# Patient Record
Sex: MALE | Race: ASIAN | NOT HISPANIC OR LATINO
[De-identification: names, ages, dates, MRNs, and addresses within clinical notes are randomized per-mention and may not be internally consistent; named-entity substitution may affect disease eponyms.]

---

## 2019-04-04 ENCOUNTER — APPOINTMENT (OUTPATIENT)
Dept: UROLOGY | Facility: CLINIC | Age: 79
End: 2019-04-04
Payer: MEDICARE

## 2019-04-04 DIAGNOSIS — Z78.9 OTHER SPECIFIED HEALTH STATUS: ICD-10-CM

## 2019-04-04 LAB
ANION GAP SERPL CALC-SCNC: 14 MMOL/L
APPEARANCE: CLEAR
BACTERIA: NEGATIVE
BILIRUBIN URINE: NEGATIVE
BLOOD URINE: NEGATIVE
BUN SERPL-MCNC: 18 MG/DL
CALCIUM SERPL-MCNC: 10.1 MG/DL
CHLORIDE SERPL-SCNC: 97 MMOL/L
CO2 SERPL-SCNC: 30 MMOL/L
COLOR: NORMAL
CREAT SERPL-MCNC: 0.86 MG/DL
GLUCOSE QUALITATIVE U: NEGATIVE
GLUCOSE SERPL-MCNC: 124 MG/DL
HYALINE CASTS: 0 /LPF
KETONES URINE: NEGATIVE
LEUKOCYTE ESTERASE URINE: NEGATIVE
MICROSCOPIC-UA: NORMAL
NITRITE URINE: NEGATIVE
PH URINE: 7
POTASSIUM SERPL-SCNC: 4.4 MMOL/L
PROTEIN URINE: NEGATIVE
PSA FREE FLD-MCNC: 15 %
PSA FREE SERPL-MCNC: 0.74 NG/ML
PSA SERPL-MCNC: 5.05 NG/ML
RED BLOOD CELLS URINE: 0 /HPF
SODIUM SERPL-SCNC: 141 MMOL/L
SPECIFIC GRAVITY URINE: 1.01
SQUAMOUS EPITHELIAL CELLS: 0 /HPF
UROBILINOGEN URINE: NORMAL
WHITE BLOOD CELLS URINE: 0 /HPF

## 2019-04-04 PROCEDURE — 99204 OFFICE O/P NEW MOD 45 MIN: CPT

## 2019-04-04 RX ORDER — METOPROLOL TARTRATE 75 MG/1
TABLET, FILM COATED ORAL
Refills: 0 | Status: ACTIVE | COMMUNITY

## 2019-04-04 RX ORDER — ENEMA 19; 7 G/133ML; G/133ML
7-19 ENEMA RECTAL
Qty: 2 | Refills: 0 | Status: ACTIVE | COMMUNITY
Start: 2019-04-04 | End: 1900-01-01

## 2019-04-04 RX ORDER — BENAZEPRIL HYDROCHLORIDE 40 MG/1
40 TABLET ORAL
Refills: 0 | Status: ACTIVE | COMMUNITY

## 2019-04-04 RX ORDER — ATORVASTATIN CALCIUM 80 MG/1
TABLET, FILM COATED ORAL
Refills: 0 | Status: ACTIVE | COMMUNITY

## 2019-04-04 RX ORDER — AMLODIPINE BESYLATE 5 MG/1
TABLET ORAL
Refills: 0 | Status: ACTIVE | COMMUNITY

## 2019-04-04 NOTE — LETTER BODY
[FreeTextEntry1] : Ronda Harris M.D.\par [address] [address]\par \par Dear Dr. Harris,\par \par Reason for Visit: Elevated PSA.\par \par This is a 78 -year-old gentleman with elevated PSA. He was referred for evaluation, accompanied by his son. Patient reports that he has not had a previous prostate biopsy. His current PSA is 4.06. Patient shares his PSA was 3.52 last year. He is currently taking Flomax and Proscar without any side-effects or difficulties. He has stable urinary flow. Patient denies any hematuria or lower urinary tract symptoms. He denies any pain.The patient denies any aggravating or relieving factors. The patient denies any interference of function. The patient is entirely asymptomatic. All other review of systems are negative. He has no cancer in his family medical history. He has no previous surgical history. Past medical history, family history and social history were inquired and were non contributory to current condition. The patient does not use tobacco or drink alcohol. Medications and allergies were reviewed.\par \par On examination, the patient is an older -appearing gentleman in no acute distress. He is alert and oriented follows commands. He has normal mood and affect. He is normocephalic. Neck is supple. Oral no thrush. Respirations are unlabored. His abdomen is soft and nontender. Bladder is nonpalpable. No CVA tenderness. Neurologically he is grossly intact. No peripheral edema. Skin without gross abnormality. He has normal male external genitalia. Normal meatus. Bilateral testes are descended intrascrotally and normal to palpation. On rectal examination, there is normal sphincter tone. The prostate is clinically benign without focal induration or nodularity.\par \par His current PSA is 4.06, which is elevated. Previous PSA two weeks ago was 4.28. \par \par Assessment: Elevated PSA.\par \par I counseled the patient. I discussed with him the risk of occult malignancy. He will obtain BMP and urinalysis today. I discussed the various etiologies of PSA elevation, including enlargement of prostate, inflammation or infection, and prostate cancer. Patient would like to confirm the diagnosis with repeating the PSA. I also ordered a prostate MRI to evaluate for abnormality. Risks and alternatives were discussed. I answered the patient questions. The patient will follow-up as directed and will contact me with any questions or concerns.Thank you for the opportunity to participate in the care of this patient. I'll keep you updated on his progress.\par \par Plan: BMP. PSA. Urinalysis. Prostate MRI. Followup in 6 months.

## 2019-04-04 NOTE — ADDENDUM
[FreeTextEntry1] : Entered by Samantha Khan, acting as scribe for Dr. Kaminski. \par \par The documentation recorded by the scribe accurately reflects the service I personally performed and the decisions made by me.\par

## 2019-04-30 ENCOUNTER — APPOINTMENT (OUTPATIENT)
Dept: UROLOGY | Facility: CLINIC | Age: 79
End: 2019-04-30
Payer: MEDICARE

## 2019-04-30 VITALS
SYSTOLIC BLOOD PRESSURE: 155 MMHG | OXYGEN SATURATION: 96 % | DIASTOLIC BLOOD PRESSURE: 92 MMHG | TEMPERATURE: 98 F | WEIGHT: 131 LBS | RESPIRATION RATE: 17 BRPM | HEART RATE: 90 BPM

## 2019-04-30 PROCEDURE — 99213 OFFICE O/P EST LOW 20 MIN: CPT

## 2019-04-30 NOTE — ADDENDUM
[FreeTextEntry1] : Entered by Luzma Urbina, acting as scribe for Dr. Alexis Kaminski.\par \par The documentation recorded by the scribe accurately reflects the service I personally performed and the decisions made by me.

## 2019-04-30 NOTE — LETTER BODY
[FreeTextEntry1] : Ronda Harris M.D.\par [address] [address]\par \par Dear Dr. Harris,\par \par Reason for Visit: Elevated PSA.\par \par This is a 78 -year-old gentleman with elevated PSA. Patient reports that he has not had a previous prostate biopsy. His current PSA is 4.06. Patient shares his PSA was 3.52 last year. He is currently taking Flomax and Proscar without any side-effects or difficulties. He has stable urinary flow. Patient returns today for MRI results. Since his last visit, the patient denies any changes in health. His recent prostate MRI was unremarkable. Patient denies any hematuria or lower urinary tract symptoms. He denies any pain.The patient denies any aggravating or relieving factors. The patient denies any interference of function. The patient is entirely asymptomatic. All other review of systems are negative. He has no cancer in his family medical history. He has no previous surgical history. Past medical history, family history and social history were inquired and were non contributory to current condition. The patient does not use tobacco or drink alcohol. Medications and allergies were reviewed.\par \par On examination, the patient is an older -appearing gentleman in no acute distress. He is alert and oriented follows commands. He has normal mood and affect. He is normocephalic. Neck is supple. Oral no thrush. Respirations are unlabored. His abdomen is soft and nontender. Bladder is nonpalpable. No CVA tenderness. Neurologically he is grossly intact. No peripheral edema. Skin without gross abnormality. He has normal male external genitalia. Normal meatus. Bilateral testes are descended intrascrotally and normal to palpation. On rectal examination, there is normal sphincter tone. The prostate is enlarged, 66 cc.\par \par His recent prostate MRI was unremarkable.\par His BMP demonstrated normal renal functions, creatinine 0.86. His PSA was 5.05, which is elevated. His urinalysis was unremarkable. His urine culture was negative. \par \par Assessment: Elevated PSA.\par \par I counseled the patient. His prostate MRI was unremarkable. I believe his elevated PSA is secondary to his enlarged prostate. Risks and alternatives were discussed. I answered the patient questions. The patient will follow-up as directed and will contact me with any questions or concerns.Thank you for the opportunity to participate in the care of this patient. I'll keep you updated on his progress.\par \par Plan: Followup in 6 months. \par

## 2019-07-09 ENCOUNTER — APPOINTMENT (OUTPATIENT)
Dept: OPHTHALMOLOGY | Facility: CLINIC | Age: 79
End: 2019-07-09

## 2019-11-01 ENCOUNTER — APPOINTMENT (OUTPATIENT)
Dept: UROLOGY | Facility: CLINIC | Age: 79
End: 2019-11-01
Payer: MEDICARE

## 2019-11-01 VITALS
RESPIRATION RATE: 17 BRPM | TEMPERATURE: 97.8 F | OXYGEN SATURATION: 97 % | DIASTOLIC BLOOD PRESSURE: 95 MMHG | SYSTOLIC BLOOD PRESSURE: 143 MMHG | HEART RATE: 96 BPM

## 2019-11-01 PROCEDURE — 99213 OFFICE O/P EST LOW 20 MIN: CPT

## 2019-11-01 NOTE — LETTER BODY
[FreeTextEntry1] : Ronda Harris M.D.\par [address] [address]\par \par Dear Dr. Harris,\par \par Reason for Visit: Elevated PSA.\par \par This is a 78 -year-old gentleman with elevated PSA. Patient reports that he has not had a previous prostate biopsy. His recent MRI was unremarkable. His current PSA is 5.05. Patient returns for follow-up. He is currently taking Flomax and Proscar without any side-effects or difficulties. He has stable urinary flow. Since his last visit, the patient denies any changes in health. Patient denies any hematuria or lower urinary tract symptoms. He denies any pain.The patient denies any aggravating or relieving factors. The patient denies any interference of function. The patient is entirely asymptomatic. All other review of systems are negative. He has no cancer in his family medical history. He has no previous surgical history. Past medical history, family history and social history were unchanged. Medications and allergies were reviewed.\par \par On examination, the patient is an older -appearing gentleman in no acute distress. He is alert and oriented follows commands. He has normal mood and affect. He is normocephalic. Neck is supple. Oral no thrush. Respirations are unlabored. His abdomen is soft and nontender. Bladder is nonpalpable. No CVA tenderness. Neurologically he is grossly intact. No peripheral edema. Skin without gross abnormality. He has normal male external genitalia. Normal meatus. Bilateral testes are descended intrascrotally and normal to palpation. On rectal examination, there is normal sphincter tone. The prostate is enlarged, 66 cc.\par \par His recent prostate MRI was unremarkable.\par  His PSA was 5.05, which is age appropriate.\par \par Assessment: Elevated PSA, stable on Proscar.\par \par I counseled the patient. In terms of his PSA and BPH, I recommend he continue taking Proscar and Flomax as directed. I also recommend he obtain BMP and PSA testing today to ensure stability. Risks and alternatives were discussed. I answered the patient questions. The patient will follow-up as directed and will contact me with any questions or concerns.Thank you for the opportunity to participate in the care of this patient. I'll keep you updated on his progress.\par \par Plan: Cont. Proscar and Flomax. BMP. PSA. Followup in 6 months.

## 2019-11-01 NOTE — ADDENDUM
[FreeTextEntry1] : Entered by Mekhi Park, acting as scribe for Dr. Alexis Kaminski.\par \par The documentation recorded by the scribe accurately reflects the service I personally performed and the decisions made by me.

## 2019-11-04 ENCOUNTER — RECORD ABSTRACTING (OUTPATIENT)
Age: 79
End: 2019-11-04

## 2019-11-04 DIAGNOSIS — R80.9 PROTEINURIA, UNSPECIFIED: ICD-10-CM

## 2019-11-04 DIAGNOSIS — R97.20 ELEVATED PROSTATE, SPECIFIC ANTIGEN [PSA]: ICD-10-CM

## 2019-11-04 DIAGNOSIS — Z00.00 ENCOUNTER FOR GENERAL ADULT MEDICAL EXAMINATION W/OUT ABNORMAL FINDINGS: ICD-10-CM

## 2019-11-04 LAB
ANION GAP SERPL CALC-SCNC: 14 MMOL/L
BUN SERPL-MCNC: 20 MG/DL
CALCIUM SERPL-MCNC: 10.4 MG/DL
CHLORIDE SERPL-SCNC: 99 MMOL/L
CO2 SERPL-SCNC: 29 MMOL/L
CREAT SERPL-MCNC: 0.91 MG/DL
GLUCOSE SERPL-MCNC: 126 MG/DL
POTASSIUM SERPL-SCNC: 4.4 MMOL/L
PSA FREE FLD-MCNC: 28 %
PSA FREE SERPL-MCNC: 2.27 NG/ML
PSA SERPL-MCNC: 8.04 NG/ML
SODIUM SERPL-SCNC: 142 MMOL/L

## 2019-11-04 RX ORDER — ENEMA 19; 7 G/133ML; G/133ML
7-19 ENEMA RECTAL
Qty: 2 | Refills: 0 | Status: ACTIVE | COMMUNITY
Start: 2019-11-04 | End: 1900-01-01

## 2019-11-07 ENCOUNTER — CLINICAL ADVICE (OUTPATIENT)
Age: 79
End: 2019-11-07

## 2019-11-15 ENCOUNTER — RECORD ABSTRACTING (OUTPATIENT)
Age: 79
End: 2019-11-15

## 2020-08-04 ENCOUNTER — APPOINTMENT (OUTPATIENT)
Dept: UROLOGY | Facility: CLINIC | Age: 80
End: 2020-08-04

## 2021-06-25 ENCOUNTER — APPOINTMENT (OUTPATIENT)
Dept: UROLOGY | Facility: CLINIC | Age: 81
End: 2021-06-25
Payer: MEDICARE

## 2021-06-25 VITALS
RESPIRATION RATE: 17 BRPM | HEART RATE: 84 BPM | SYSTOLIC BLOOD PRESSURE: 155 MMHG | TEMPERATURE: 98 F | OXYGEN SATURATION: 95 % | DIASTOLIC BLOOD PRESSURE: 89 MMHG | WEIGHT: 131 LBS

## 2021-06-25 PROCEDURE — 99213 OFFICE O/P EST LOW 20 MIN: CPT

## 2021-06-25 NOTE — ADDENDUM
[FreeTextEntry1] : Entered by Venita Jeong, acting as scribe for Dr. Alexis Kaminski.\par \par The documentation recorded by the scribe accurately reflects the service I personally performed and the decisions made by me.

## 2021-06-25 NOTE — HISTORY OF PRESENT ILLNESS
[FreeTextEntry1] : Please refer to URO Consult note \par \par fu elevated psa \par 5.3 improved \par pr-rad 2 mri 2019  \par repeat psa \par fu in 1 yr \par

## 2021-06-25 NOTE — LETTER BODY
[FreeTextEntry1] : Ronda Harris M.D.\par [address] [address]\par \par Dear Dr. Harris,\par \par Reason for Visit: Elevated PSA.\par \par This is a 80 -year-old gentleman with elevated PSA. His prostate MRI from 2019 demonstrated PI-RADS 2. His current PSA is 5.3. Patient returns for follow-up. He is currently taking Flomax and Proscar without any side-effects or difficulties. He has stable urinary flow. Since his last visit, the patient denies any changes in health. Patient denies any hematuria or lower urinary tract symptoms. He denies any pain. The patient is entirely asymptomatic. All other review of systems are negative. His family and social history remain unchanged. Past medical history, family history and social history were unchanged. Medications and allergies were reviewed.\par \par On examination, the patient is an older -appearing gentleman in no acute distress. He is alert and oriented follows commands. He has normal mood and affect. He is normocephalic. Neck is supple. Oral no thrush. Respirations are unlabored. His abdomen is soft and nontender. Bladder is nonpalpable. No CVA tenderness. Neurologically he is grossly intact. No peripheral edema. Skin without gross abnormality. He has normal male external genitalia. Normal meatus. Bilateral testes are descended intrascrotally and normal to palpation. On rectal examination, there is normal sphincter tone. The prostate is enlarged, 66 cc.\par \par His BMP demonstrated normal renal functions, creatinine 0.98. His PSA was 5.3, which is stable.\par \par Assessment: Elevated PSA, stable on Proscar.\par \par I counseled the patient. I reassured the patient. He is doing well. His PSA is stable. In terms of his BPH, I recommended he continue taking Proscar and Flomax as directed. I also recommended he repeat BMP and PSA testing today to ensure stability.  Risks and alternatives were discussed. I answered the patient questions. The patient will follow-up as directed and will contact me with any questions or concerns.Thank you for the opportunity to participate in the care of this patient. I'll keep you updated on his progress.\par \par Plan: Continue Proscar and Flomax. BMP. PSA. Followup in 1 year.

## 2022-06-17 ENCOUNTER — APPOINTMENT (OUTPATIENT)
Dept: UROLOGY | Facility: CLINIC | Age: 82
End: 2022-06-17
Payer: MEDICARE

## 2022-06-17 VITALS
HEART RATE: 86 BPM | DIASTOLIC BLOOD PRESSURE: 65 MMHG | OXYGEN SATURATION: 98 % | WEIGHT: 138 LBS | TEMPERATURE: 97.7 F | SYSTOLIC BLOOD PRESSURE: 113 MMHG | RESPIRATION RATE: 17 BRPM

## 2022-06-17 PROCEDURE — 99214 OFFICE O/P EST MOD 30 MIN: CPT

## 2022-06-17 RX ORDER — FINASTERIDE 5 MG/1
5 TABLET, FILM COATED ORAL DAILY
Qty: 90 | Refills: 3 | Status: ACTIVE | COMMUNITY
Start: 1900-01-01 | End: 1900-01-01

## 2022-06-17 RX ORDER — TAMSULOSIN HYDROCHLORIDE 0.4 MG/1
0.4 CAPSULE ORAL
Qty: 90 | Refills: 3 | Status: ACTIVE | COMMUNITY

## 2022-06-17 NOTE — LETTER BODY
[FreeTextEntry1] : Ronda Harris M.D.\par [address] [address]\par \par Dear Dr. Harris,\par \par Reason for Visit: BPH.  Elevated PSA.\par \par This is a 81 year-old gentleman with BPH and elevated PSA. His prostate MRI from 2019 demonstrated no suspicious prostatic lesions, PI-RADS 2. The patient returns for follow-up. Since he was last seen, the patient reports taking Flomax QD and Proscar regularly without any side-effects or difficulties. He notes good urine flow and  stable urinary symptoms on medical therapy. The patient denies any hematuria or lower urinary tract symptoms. He denies any pain. All other review of systems are negative. His family and social history remain unchanged. Past medical history, family history and social history were unchanged. Medications and allergies were reviewed.\par \par On examination, the patient is an older -appearing gentleman in no acute distress. He is alert and oriented follows commands. He has normal mood and affect. He is normocephalic. Neck is supple. Oral no thrush. Respirations are unlabored. His abdomen is soft and nontender. Bladder is nonpalpable. No CVA tenderness. Neurologically he is grossly intact. No peripheral edema. Skin without gross abnormality. He has normal male external genitalia. Normal meatus. Bilateral testes are descended intrascrotally and normal to palpation. On rectal examination, there is normal sphincter tone. The prostate is enlarged, 66 cc.\par \par His CMP demonstrated normal renal functions, creatinine 0.94. His PSA was 4.45, which is improved. His previous PSA was 5.3. \par \par Assessment: BPH. Elevated PSA. \par \par I counseled the patient. He is doing well. In terms of his BPH, the patient reports stable urinary symptoms on medical therapy. I recommended the patient continue taking Flomax QD and Proscar. I renewed the patient's prescription for Flomax and Proscar today. I encouraged the patient to continue medications regularly as directed. In terms of his elevated PSA, I reassured he patient as his recent PSA was improved. I recommended he follow up in 1 year for PSA testing to ensure stability. Risks and alternatives were discussed. I answered the patient questions. The patient will follow-up as directed and will contact me with any questions or concerns.Thank you for the opportunity to participate in the care of this patient. I'll keep you updated on his progress.\par \par Plan: Continue Flomax and Proscar QD. Follow up in 1 year.

## 2023-06-16 ENCOUNTER — APPOINTMENT (OUTPATIENT)
Dept: UROLOGY | Facility: CLINIC | Age: 83
End: 2023-06-16